# Patient Record
Sex: MALE | Race: WHITE | Employment: FULL TIME | URBAN - METROPOLITAN AREA
[De-identification: names, ages, dates, MRNs, and addresses within clinical notes are randomized per-mention and may not be internally consistent; named-entity substitution may affect disease eponyms.]

---

## 2019-12-23 PROBLEM — J30.9 ALLERGIC RHINITIS: Status: ACTIVE | Noted: 2019-12-23

## 2019-12-23 PROBLEM — R41.3 MEMORY CHANGES: Status: ACTIVE | Noted: 2019-12-23

## 2019-12-23 PROBLEM — Z00.00 PREVENTATIVE HEALTH CARE: Status: ACTIVE | Noted: 2019-12-23

## 2020-01-03 PROBLEM — R03.0 ELEVATED BP WITHOUT DIAGNOSIS OF HYPERTENSION: Status: ACTIVE | Noted: 2020-01-03

## 2020-01-22 PROBLEM — Z00.00 PREVENTATIVE HEALTH CARE: Status: RESOLVED | Noted: 2019-12-23 | Resolved: 2020-01-22

## 2020-10-30 PROBLEM — I10 ESSENTIAL HYPERTENSION: Status: ACTIVE | Noted: 2020-10-30

## 2021-03-03 PROBLEM — G30.0 EARLY ONSET ALZHEIMER'S DEMENTIA WITHOUT BEHAVIORAL DISTURBANCE (HCC): Status: ACTIVE | Noted: 2021-03-03

## 2021-03-03 PROBLEM — F02.80 EARLY ONSET ALZHEIMER'S DEMENTIA WITHOUT BEHAVIORAL DISTURBANCE (HCC): Status: ACTIVE | Noted: 2021-03-03

## 2021-08-27 PROBLEM — R00.1 BRADYCARDIA: Status: ACTIVE | Noted: 2021-08-27

## 2022-02-25 PROBLEM — R03.0 ELEVATED BP WITHOUT DIAGNOSIS OF HYPERTENSION: Status: RESOLVED | Noted: 2020-01-03 | Resolved: 2022-02-25

## 2022-08-29 ENCOUNTER — HOSPITAL ENCOUNTER (OUTPATIENT)
Dept: OCCUPATIONAL THERAPY | Age: 57
Setting detail: THERAPIES SERIES
Discharge: HOME | End: 2022-08-29
Payer: COMMERCIAL

## 2022-08-29 PROCEDURE — 97537 COMMUNITY/WORK REINTEGRATION: CPT

## 2022-08-29 PROCEDURE — 97165 OT EVAL LOW COMPLEX 30 MIN: CPT

## 2022-08-29 NOTE — PROGRESS NOTES
8/29/2022    Dear Dr. Toño Gomes (MR# [id number]) was seen by Occupational Therapy on 8/29/2022 for a s Evaluation at [facility].  As you know, Mr. Carla Crouch suffers from Alzheimer's Disease. He wants to maintain driving to be independent in community mobility and leisure skills. Mr. Carla Crouch passed tests for visual acuity, saccades, pursuits, depth perception, peripheral vision, and color vision. He was only able to identify five out of nine traffic signs and signals. He was administered the Trails Making Tests Part A and B which are cognitive tests that involve scanning, speed of mental processing, visual motor sequencing, as well as switching cognitive sets. On Part A, he scored below normal limits with 300 seconds over a norm of 60 seconds and Part B was not administered due to his difficulty with completing Part A. He demonstrated functional physical capacity for driving. He demonstrated difficulty with following directions throughout the evaluation which limited his ability to complete some of the tests. He demonstrated decreased speeds of cognitive processing which is concerning for his ability to react on the road and to adjust to variations in driving conditions. Based on the results of this evaluation, it appears that Mr. Carla Crouch is NOT a suitable candidate to maintain driving. The final decision remains with the physician. Please inform Mr. Carla Crouch of your decision. I can be reached at [phone] if questions arise.   Thank you for this referral.    Sincerely,      Caridad Rodney, [address] [facility]   Certified  Rehabilitation Specialist

## 2022-08-29 NOTE — PROGRESS NOTES
Occupational Therapy  Name: Elise Minaya  1965  Date: 8/29/2022  10:05 AM      Time In: 1000  Time Out: 1200  Billed Units: 8  CPT 80998: OT Low Eval units __1_  CPT 15071: OT Work Conditioning  units _7___  Diagnosis: Alzheimer's Disease  Prior Level of Functioning: Wife organizes medications and pt takes them himself. Independent with Aleksandra Dailey, ADL's. 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 Since last appointment with physician he only drives to work until driving evaluation is completed. Car Make/Model and transmission type: [employer], automatic  Driving Habits: Frequency: everyday  Night: yes  Snow: yes  Highway: ?yes  Traffic tickets in last 5 years: no  Accidents in last 5 years: no  Explain:    VISION:  Acuity: ([facility] law =20/40 night driving; 47/61 day driving): ____82/93____ without corrective lenses  Peripheral field: ([address] [place] [address] requires 70? visual field on both sides of a fixation point for a non-restricted license or 39? on the other side)  INTACT    Color Vision:  INTACT  Saccades: (ability to rapidly change fixation from one point in the visual field to another)    INTACT   Pursuits: (the continued fixation of a moving object)    INTACT   Depth Perception:    INTACT       COGNITION:  Trail Making Test Parts A & B (involve scanning, speed of mental processing and visual motor sequencing.   Trail Making Part B involves switching cognitive sets too)  Trail Making Part A:   _____300______seconds (Norm 60 seconds)  Trail Making Part B:   _______Did not test due to difficulty on part A.____seconds (Norm 180 seconds)          ROAD SIGN RECOGNITION:  ____4____/9 presented correctly identified    PHYSICAL:          Sensation: __WFL_ _______________________________________________________    (exceptions to normal limits marked by \"X\" and explained)   AROM-  RIGHT AROM-  LEFT STRENGTH-RIGHT STRENGTH-LEFT   SHOULDER       ELBOW       WRIST       HAND       HIP       KNEE       ANKLE         Deficits explained: ________________________________________________________     UE- RIGHT UE- LEFT LE-RIGHT LE-LEFT   PROPRIOCEPTION       LIGHT TOUCH         COORDINATION:  (\"X\" to signify intact or impaired)     INTACT IMPAIRED   NECK ROM x    HEEL TO SHIN x    FINGER-NOSE-KNEE  X-difficulty with following directions. SITTING BALANCE x    DIADOKOKINESIS       Client's Stated Goal: To continue driving. ON THE ROAD PERFORMANCE: He was able to brake appropriately and maintain his deyvi. He demonstrated difficulty with following directions throughout the evaluation. He could not complete finger-nose-knee due to difficulty with following directions. He demonstrated difficulty with following Trails Making Part A ,which is connecting numbers in order, as he frequently lost track of his last number and which number to go to next. He was only able to identify 5/9 road signs. RECOMMENDATIONS: Pt demonstrates difficulty with following directions, and problem solving. Pt demonstrates slow speeds of mental processing. Recommend pt cease driving.       WINSTON Krishnan, CDRS, [facility] [facility]   Certified  Rehabilitation Specialist

## 2022-11-10 ENCOUNTER — OFFICE VISIT (OUTPATIENT)
Dept: SURGERY | Age: 57
End: 2022-11-10
Payer: COMMERCIAL

## 2022-11-10 VITALS — WEIGHT: 197 LBS | BODY MASS INDEX: 28.27 KG/M2

## 2022-11-10 DIAGNOSIS — K40.90 LEFT INGUINAL HERNIA: Primary | ICD-10-CM

## 2022-11-10 PROCEDURE — 99204 OFFICE O/P NEW MOD 45 MIN: CPT | Performed by: SURGERY

## 2022-11-10 ASSESSMENT — ENCOUNTER SYMPTOMS: GASTROINTESTINAL NEGATIVE: 1

## 2022-11-10 NOTE — LETTER
Surgeon: Rosario Benites MD     Your surgery will be at [facility]  First floor of the [address] [place] [address]. Ana Cortez [address]    Date:11/21/2022    Arrival time:7:25am    Surgery time: 8:50am    (   ) Outpatient with general anesthesia     (  x ) Outpatient with IV sedation     (    ) Local anethesia    You will need to have a  drive you home due to anesthesia. Nothing to eat or drink after midnight the night before. FASTING SURGERY  No driving for 1 week after surgery. You will have a 10 lbs weight restriction for 6 weeks post surgery      I will call you a few days prior to surgery to confirm date and time if any changes. Please be sure to bring I.D. and insurance card at the time of surgery, you will check in with surgery on the first floor. Please contact Kelsi if you need to reschedule your surgery or have any questions.   Office phone number:     [phone]  Fax number for [facility]:    [phone]

## 2022-11-10 NOTE — PROGRESS NOTES
Subjective:      Patient ID: Jess Butler is a 61 y.o. male. HPI  Chief Complaint: hernia  Patient referred by Dr. Jany Farfan for evaluation of hernia. Patient reports symptoms of bulge. Denies much pain. Location of symptoms is left groin extending into scrotum. Symptoms were first noted \"years ago\" but wife just noticed recently. Alleviated by lying down. Symptoms aggravated by prolonged standing. Works in real estate. Previous evaluation includes exam by PCP. Patient has a history of Alzheimer's, HTN. Will plan following treatment: left inguinal hernia repair. Past Medical History:   Diagnosis Date    Hearing loss        No past surgical history on file. Current Outpatient Medications   Medication Sig Dispense Refill    cetirizine (ZYRTEC) 5 MG tablet Take 5 mg by mouth daily      valsartan (DIOVAN) 80 MG tablet TAKE 1 TABLET BY MOUTH DAILY 90 tablet 1    donepezil (ARICEPT) 10 MG tablet 1.5 tablets nightly 30 tablet 5    Multiple Vitamins-Minerals (CENTRUM SILVER 50+MEN PO) Take by mouth       No current facility-administered medications for this visit. Prior to Admission medications    Medication Sig Start Date End Date Taking?  Authorizing Provider   cetirizine (ZYRTEC) 5 MG tablet Take 5 mg by mouth daily   Yes Historical Provider, MD   valsartan (DIOVAN) 80 MG tablet TAKE 1 TABLET BY MOUTH DAILY 11/4/22  Yes Niki Echavarria MD   donepezil (ARICEPT) 10 MG tablet 1.5 tablets nightly 9/29/21  Yes Niki Echavarria MD   Multiple Vitamins-Minerals (CENTRUM SILVER 50+MEN PO) Take by mouth   Yes Historical Provider, MD         No Known Allergies    Social History     Socioeconomic History    Marital status:      Spouse name: Not on file    Number of children: Not on file    Years of education: Not on file    Highest education level: Not on file   Occupational History    Not on file   Tobacco Use    Smoking status: Never    Smokeless tobacco: Never   Substance and Sexual Activity Alcohol use: Yes    Drug use: Never    Sexual activity: Not on file   Other Topics Concern    Not on file   Social History Narrative    Not on file     Social Determinants of Health     Financial Resource Strain: Unknown    Difficulty of Paying Living Expenses: Patient refused   Food Insecurity: Unknown    Worried About Running Out of Food in the Last Year: Patient refused    Ran Out of Food in the Last Year: Patient refused   Transportation Needs: Not on file   Physical Activity: Not on file   Stress: Not on file   Social Connections: Not on file   Intimate Partner Violence: Not on file   Housing Stability: Not on file       Family History   Problem Relation Age of Onset    Atrial Fibrillation Mother     Cancer Father        Review of Systems   Constitutional: Negative. Gastrointestinal: Negative. All other systems reviewed and are negative. Objective:   Physical Exam  Vitals reviewed. Constitutional:       General: He is not in acute distress. Appearance: Normal appearance. He is well-developed. He is not diaphoretic. HENT:      Head: Normocephalic and atraumatic. Right Ear: External ear normal.      Left Ear: External ear normal.   Eyes:      Conjunctiva/sclera: Conjunctivae normal.      Pupils: Pupils are equal, round, and reactive to light. Neck:      Trachea: Trachea normal.   Cardiovascular:      Rate and Rhythm: Normal rate and regular rhythm. Heart sounds: Normal heart sounds, S1 normal and S2 normal.   Pulmonary:      Effort: Pulmonary effort is normal. No respiratory distress. Breath sounds: Normal breath sounds. No wheezing. Abdominal:      General: There is no distension. Palpations: Abdomen is soft. Hernia: A hernia (partially reducible left inguinoscrotal hernia) is present. Musculoskeletal:         General: Normal range of motion. Cervical back: Normal range of motion and neck supple. Skin:     General: Skin is warm and dry.       Findings: No erythema. Neurological:      Mental Status: He is alert and oriented to person, place, and time. Psychiatric:         Behavior: Behavior normal. Behavior is cooperative. Thought Content: Thought content normal.         Judgment: Judgment normal.       Assessment:       Diagnosis Orders   1. Left inguinal hernia                Plan:      Plan left inguinal hernia repair 11/21  The risks, benefits and alternatives to the planned procedure were discussed. Patient expressed an understanding and is willing to proceed.           Sonu Bryan MD

## 2022-11-11 NOTE — PROGRESS NOTES
[facility] time____0710________        Surgery time____0840________    Take the following medications with a sip of water: Follow your MD/Surgeons pre-procedure instructions regarding your medications     Do not eat or drink anything after 12:00 midnight prior to your surgery. This includes water chewing gum, mints and ice chips. You may brush your teeth and gargle the morning of your surgery, but do not swallow the water     Please see your family doctor/pediatrician for a history and physical and/or concerning medications. Bring any test results/reports from your physicians office. If you are under the care of a heart doctor or specialist doctor, please be aware that you may be asked to them for clearance    You may be asked to stop blood thinners such as Coumadin, Plavix, Fragmin, Lovenox, etc., or any anti-inflammatories such as:  Aspirin, Ibuprofen, Advil, Naproxen prior to your surgery. We also ask that you stop any OTC medications such as fish oil, vitamin E, glucosamine, garlic, Multivitamins, COQ 10, etc.    We ask that you do not smoke 24 hours prior to surgery  We ask that you do not  drink any alcoholic beverages 24 hours prior to surgery     You must make arrangements for a responsible adult to take you home after your surgery. For your safety you will not be allowed to leave alone or drive yourself home. Your surgery will be cancelled if you do not have a ride home. Also for your safety, it is strongly suggested that someone stay with you the first 24 hours after your surgery. A parent or legal guardian must accompany a child scheduled for surgery and plan to stay at the hospital until the child is discharged. Please do not bring other children with you. For your comfort, please wear simple loose fitting clothing to the hospital.  Please do not bring valuables.     Do not wear any make-up or nail polish on your fingers or toes      For your safety, please do not wear any jewelry or body piercing's on the day of surgery. All jewelry must be removed. If you have dentures, they will be removed before going to operating room. For your convenience, we will provide you with a container. If you wear contact lenses or glasses, they will be removed, please bring a case for them. If you have a living will and a durable power of  for healthcare, please bring in a copy. As part of our patient safety program to minimize surgical site infections, we ask you to do the following:    Please notify your surgeon if you develop any illness between         now and the  day of your surgery. This includes a cough, cold, fever, sore throat, nausea,         or vomiting, and diarrhea, etc.   Please notify your surgeon if you experience dizziness, shortness         of breath or blurred vision between now and the time of your surgery. Do not shave your operative site 96 hours prior to surgery. For face and neck surgery, men may use an electric razor 48 hours   prior to surgery. You may shower the night before surgery or the morning of   your surgery with an antibacterial soap. You will need to bring a photo ID and insurance card    [facility] has an onsite pharmacy, would you like to utilize our pharmacy     If you will be staying overnight and use a C-pap machine, please bring   your C-pap to hospital     Our goal is to provide you with excellent care, therefore, visitors will be limited to two(2) in the room at a time so that we may focus on providing this care for you. Please contact pre-admission testing if you have any further questions. [facility] phone number:  [phone] PAT fax number:  [phone]  Please note these are generalized instructions for all surgical cases, you may be provided with more specific instructions according to your surgery.     C-Difficile admission screening and protocol:       * Admitted with diarrhea? [] YES    [x]  NO     *Prior history of C-Diff. In last 3 months? [] YES    [x]  NO     *Antibiotic use in the past 6-8 weeks? [x]  NO    []  YES                 If yes, which ANTIBIOTIC AND REASON______     *Prior hospitalization or nursing home in the last month? []  YES    [x]  NO        SAFETY FIRST. .call before you fall

## 2022-11-17 ENCOUNTER — ANESTHESIA EVENT (OUTPATIENT)
Dept: OPERATING ROOM | Age: 57
End: 2022-11-17
Payer: COMMERCIAL

## 2022-11-21 ENCOUNTER — ANESTHESIA (OUTPATIENT)
Dept: OPERATING ROOM | Age: 57
End: 2022-11-21
Payer: COMMERCIAL

## 2022-11-21 ENCOUNTER — HOSPITAL ENCOUNTER (OUTPATIENT)
Age: 57
Setting detail: OUTPATIENT SURGERY
Discharge: HOME | End: 2022-11-21
Attending: SURGERY | Admitting: SURGERY
Payer: COMMERCIAL

## 2022-11-21 VITALS
BODY MASS INDEX: 28.09 KG/M2 | OXYGEN SATURATION: 98 % | DIASTOLIC BLOOD PRESSURE: 86 MMHG | SYSTOLIC BLOOD PRESSURE: 141 MMHG | RESPIRATION RATE: 14 BRPM | TEMPERATURE: 97.8 F | WEIGHT: 200.62 LBS | HEART RATE: 58 BPM | HEIGHT: 71 IN

## 2022-11-21 DIAGNOSIS — K40.90 LEFT INGUINAL HERNIA: Primary | ICD-10-CM

## 2022-11-21 DIAGNOSIS — K40.90 UNILATERAL INGUINAL HERNIA WITHOUT OBSTRUCTION OR GANGRENE, RECURRENCE NOT SPECIFIED: ICD-10-CM

## 2022-11-21 PROCEDURE — 7100000010 HC PHASE II RECOVERY - FIRST 15 MIN: Performed by: SURGERY

## 2022-11-21 PROCEDURE — 49505 PRP I/HERN INIT REDUC >5 YR: CPT | Performed by: SURGERY

## 2022-11-21 PROCEDURE — C1781 MESH (IMPLANTABLE): HCPCS | Performed by: SURGERY

## 2022-11-21 PROCEDURE — 3600000013 HC SURGERY LEVEL 3 ADDTL 15MIN: Performed by: SURGERY

## 2022-11-21 PROCEDURE — 6370000000 HC RX 637 (ALT 250 FOR IP): Performed by: ANESTHESIOLOGY

## 2022-11-21 PROCEDURE — 3600000003 HC SURGERY LEVEL 3 BASE: Performed by: SURGERY

## 2022-11-21 PROCEDURE — 2500000003 HC RX 250 WO HCPCS

## 2022-11-21 PROCEDURE — 2580000003 HC RX 258: Performed by: SURGERY

## 2022-11-21 PROCEDURE — 3700000000 HC ANESTHESIA ATTENDED CARE: Performed by: SURGERY

## 2022-11-21 PROCEDURE — 2500000003 HC RX 250 WO HCPCS: Performed by: SURGERY

## 2022-11-21 PROCEDURE — 7100000011 HC PHASE II RECOVERY - ADDTL 15 MIN: Performed by: SURGERY

## 2022-11-21 PROCEDURE — 2580000003 HC RX 258: Performed by: ANESTHESIOLOGY

## 2022-11-21 PROCEDURE — 7100000001 HC PACU RECOVERY - ADDTL 15 MIN: Performed by: SURGERY

## 2022-11-21 PROCEDURE — 3700000001 HC ADD 15 MINUTES (ANESTHESIA): Performed by: SURGERY

## 2022-11-21 PROCEDURE — 6360000002 HC RX W HCPCS

## 2022-11-21 PROCEDURE — 7100000000 HC PACU RECOVERY - FIRST 15 MIN: Performed by: SURGERY

## 2022-11-21 PROCEDURE — 6360000002 HC RX W HCPCS: Performed by: SURGERY

## 2022-11-21 PROCEDURE — 2709999900 HC NON-CHARGEABLE SUPPLY: Performed by: SURGERY

## 2022-11-21 PROCEDURE — 88302 TISSUE EXAM BY PATHOLOGIST: CPT

## 2022-11-21 DEVICE — MESH HERN W3XL6IN INGUINAL POLYPR MFIL RECTANG: Type: IMPLANTABLE DEVICE | Site: GROIN | Status: FUNCTIONAL

## 2022-11-21 RX ORDER — OXYCODONE HYDROCHLORIDE 5 MG/1
5 TABLET ORAL EVERY 6 HOURS PRN
Qty: 20 TABLET | Refills: 0 | Status: SHIPPED | OUTPATIENT
Start: 2022-11-21 | End: 2022-11-26

## 2022-11-21 RX ORDER — SODIUM CHLORIDE 0.9 % (FLUSH) 0.9 %
5-40 SYRINGE (ML) INJECTION EVERY 12 HOURS SCHEDULED
Status: DISCONTINUED | OUTPATIENT
Start: 2022-11-21 | End: 2022-11-21 | Stop reason: HOSPADM

## 2022-11-21 RX ORDER — SODIUM CHLORIDE 9 MG/ML
INJECTION, SOLUTION INTRAVENOUS PRN
Status: DISCONTINUED | OUTPATIENT
Start: 2022-11-21 | End: 2022-11-21 | Stop reason: HOSPADM

## 2022-11-21 RX ORDER — PROPOFOL 10 MG/ML
INJECTION, EMULSION INTRAVENOUS CONTINUOUS PRN
Status: DISCONTINUED | OUTPATIENT
Start: 2022-11-21 | End: 2022-11-21 | Stop reason: SDUPTHER

## 2022-11-21 RX ORDER — ONDANSETRON 2 MG/ML
4 INJECTION INTRAMUSCULAR; INTRAVENOUS
Status: DISCONTINUED | OUTPATIENT
Start: 2022-11-21 | End: 2022-11-21 | Stop reason: HOSPADM

## 2022-11-21 RX ORDER — FENTANYL CITRATE 50 UG/ML
25 INJECTION, SOLUTION INTRAMUSCULAR; INTRAVENOUS EVERY 5 MIN PRN
Status: DISCONTINUED | OUTPATIENT
Start: 2022-11-21 | End: 2022-11-21 | Stop reason: HOSPADM

## 2022-11-21 RX ORDER — FENTANYL CITRATE 50 UG/ML
INJECTION, SOLUTION INTRAMUSCULAR; INTRAVENOUS PRN
Status: DISCONTINUED | OUTPATIENT
Start: 2022-11-21 | End: 2022-11-21 | Stop reason: SDUPTHER

## 2022-11-21 RX ORDER — SODIUM CHLORIDE 0.9 % (FLUSH) 0.9 %
5-40 SYRINGE (ML) INJECTION PRN
Status: DISCONTINUED | OUTPATIENT
Start: 2022-11-21 | End: 2022-11-21 | Stop reason: HOSPADM

## 2022-11-21 RX ORDER — LIDOCAINE HYDROCHLORIDE 20 MG/ML
INJECTION, SOLUTION EPIDURAL; INFILTRATION; INTRACAUDAL; PERINEURAL PRN
Status: DISCONTINUED | OUTPATIENT
Start: 2022-11-21 | End: 2022-11-21 | Stop reason: SDUPTHER

## 2022-11-21 RX ORDER — MIDAZOLAM HYDROCHLORIDE 1 MG/ML
INJECTION INTRAMUSCULAR; INTRAVENOUS PRN
Status: DISCONTINUED | OUTPATIENT
Start: 2022-11-21 | End: 2022-11-21 | Stop reason: SDUPTHER

## 2022-11-21 RX ORDER — BUPIVACAINE HYDROCHLORIDE 5 MG/ML
INJECTION, SOLUTION EPIDURAL; INTRACAUDAL
Status: COMPLETED | OUTPATIENT
Start: 2022-11-21 | End: 2022-11-21

## 2022-11-21 RX ORDER — SODIUM CHLORIDE 9 MG/ML
INJECTION, SOLUTION INTRAVENOUS CONTINUOUS
Status: DISCONTINUED | OUTPATIENT
Start: 2022-11-21 | End: 2022-11-21 | Stop reason: HOSPADM

## 2022-11-21 RX ORDER — OXYCODONE HYDROCHLORIDE 5 MG/1
5 TABLET ORAL
Status: COMPLETED | OUTPATIENT
Start: 2022-11-21 | End: 2022-11-21

## 2022-11-21 RX ORDER — DROPERIDOL 2.5 MG/ML
0.62 INJECTION, SOLUTION INTRAMUSCULAR; INTRAVENOUS
Status: DISCONTINUED | OUTPATIENT
Start: 2022-11-21 | End: 2022-11-21 | Stop reason: HOSPADM

## 2022-11-21 RX ADMIN — CEFAZOLIN 2000 MG: 2 INJECTION, POWDER, FOR SOLUTION INTRAMUSCULAR; INTRAVENOUS at 08:38

## 2022-11-21 RX ADMIN — SODIUM CHLORIDE: 9 INJECTION, SOLUTION INTRAVENOUS at 09:18

## 2022-11-21 RX ADMIN — MIDAZOLAM 2 MG: 1 INJECTION INTRAMUSCULAR; INTRAVENOUS at 08:32

## 2022-11-21 RX ADMIN — PROPOFOL 180 MCG/KG/MIN: 10 INJECTION, EMULSION INTRAVENOUS at 08:37

## 2022-11-21 RX ADMIN — LIDOCAINE HYDROCHLORIDE 50 MG: 20 INJECTION, SOLUTION EPIDURAL; INFILTRATION; INTRACAUDAL; PERINEURAL at 08:37

## 2022-11-21 RX ADMIN — FENTANYL CITRATE 50 MCG: 50 INJECTION INTRAMUSCULAR; INTRAVENOUS at 08:45

## 2022-11-21 RX ADMIN — SODIUM CHLORIDE: 9 INJECTION, SOLUTION INTRAVENOUS at 08:14

## 2022-11-21 RX ADMIN — FENTANYL CITRATE 50 MCG: 50 INJECTION INTRAMUSCULAR; INTRAVENOUS at 08:37

## 2022-11-21 RX ADMIN — OXYCODONE 5 MG: 5 TABLET ORAL at 10:49

## 2022-11-21 ASSESSMENT — PAIN DESCRIPTION - LOCATION: LOCATION: GROIN

## 2022-11-21 ASSESSMENT — PAIN DESCRIPTION - DESCRIPTORS: DESCRIPTORS: ACHING

## 2022-11-21 ASSESSMENT — PAIN - FUNCTIONAL ASSESSMENT: PAIN_FUNCTIONAL_ASSESSMENT: 0-10

## 2022-11-21 ASSESSMENT — PAIN SCALES - GENERAL
PAINLEVEL_OUTOF10: 0
PAINLEVEL_OUTOF10: 2

## 2022-11-21 ASSESSMENT — PAIN DESCRIPTION - ORIENTATION: ORIENTATION: LEFT

## 2022-11-21 ASSESSMENT — LIFESTYLE VARIABLES: SMOKING_STATUS: 0

## 2022-11-21 NOTE — DISCHARGE INSTRUCTIONS
Sonu Bryan MD     General and Vascular Surgery   Gunjan Goode MD     [address] [facility] MD [address]     [address] [facility] [address]  [place]    Phone: [phone]           Fax: [phone]                                                                 POST-OPERATIVE INSTRUCTIONS FOR HERNIA REPAIR    Call the office to schedule your post-operative appointment with your surgeon for two (2) weeks. You will have a water occlusive glue closing your incisions. You may shower. Wash incisions gently, and pat them dry. Do not rub your incisions. Do not soak incisions in tub baths, hot tubs or pools    General guidelines for activity:  Avoid strenuous activity or lifting/pushing/pulling anything heavier than 10 pounds for 6 weeks. It is OK to be up walking around; walking up and down stairs is also OK. Do what is comfortable: stop and rest when you feel tired. Drink plenty of fluids and stay on a bland diet for 2-3 days after surgery. Do NOT drive for 5 days and while taking your narcotic pain medicine. Watch for signs of infection:   Fever over 100.5°   Excessive warmth or bright redness around your incisions  Leakage of bloody or cloudy fluid from you incisions    You will have pain medicine ordered. Take as directed. If you experience constipation  Increase your water intake, and activity; walking is best.  A stool softener or mild laxative may be necessary if you still have not had a bowel  movement ; call the office for further instructions.

## 2022-11-21 NOTE — OP NOTE
Inguinal Hernia Operative Report      Patient: Jitendra [place] MRN: [id number]     YOB: 1965  Age: 61 y.o. Sex: male        Primary Care Physician: Sami Zimmer MD         DATE OF OPERATION: 11/21/2022     PREOPERATIVE DIAGNOSIS: left inguinal hernia    POSTOPERATIVE DIAGNOSIS: left inguinal hernia - indirect    PROCEDURE PERFORMED: left inguinal hernia repair with 3x6 inch Marlex mesh    SURGEON: Kimberly Crowell MD, MD     ANESTHESIA: MAC with local anesthetic. ASA CLASS: 2    ANTIBIOTICS: Ancef 2 grams IV. DVT PROPHYLAXIS: Bilateral pneumatic compression boots. INDICATIONS:   The patient was seen in the office for an increasingly symptomatic, left inguinal hernia. As a result, the risks, benefits, and alternatives were discussed at length, including bleeding, infection, nerve pain, and recurrence. The patient's questions were answered and they were agreeable to proceed. DESCRIPTION OF PROCEDURE:   The patient was brought to the operating room suite and placed in the supine position on the operating room table. Anesthesia was induced which he tolerated well. The left groin was clipped free of hair and then prepped and draped in the usual sterile fashion. A timeout was performed, and all parties were in agreement. After injecting local anesthetic, a skin incision was made in the natural skin crease in the left groin. This was carried down sharply through Rehan's fascia. The external oblique fascia was cleared off and then additional local anesthetic was injected subfascially. The external oblique was opened in the direction of its fibers and flaps were raised. The ilioinguinal nerve was identified and preserved. The spermatic cord and its contents were encircled with a Penrose drain. A large indirect hernia was appreciated and dissected free from the spermatic cord. The hernia sac was highly ligated with a 2-0 silk suture. No other pathology was noted.  At that point, a 3 x 6 piece of Marlex mesh was cut to size in a keyhole manner. It was tacked to the pubic tubercle with a 2-0 PDS and this was run to tack the inferior edge of the mesh to the iliopubic tract. We tacked the mesh with interrupted sutures medially as well to the transversalis fascia. The internal inguinal ring was recreated by approximating the cut leaflets of the mesh. The tip of my forceps was able to pass through the newly created ring making sure it was not too tight. The inguinal nerve was then returned to its natural position. Hemostasis was achieved and the external oblique fascia was reapproximated with an 0 Vicryl. Scarpas was closed with 3-0 Vicryl, and the skin was closed with 4-0 Vicryl. Additional anesthetic was injected at the end of the case. Skin affix dermal adhesive was applied to the wound. Overall, the patient tolerated the procedure well. He was taken to PACU in stable condition. All counts were correct at the end of the case.     EBL: minimal    Specimen: hernia sac    Complications: none    Electronically signed by Renetta Quinonez MD on 11/21/2022 at 9:14 AM

## 2022-11-21 NOTE — PROGRESS NOTES
Pt wakes easy to v/o. VSS. Pt denies pain and nausea. Incisions remains cdi with ice applied. Pt stable ready for phase 2.

## 2022-11-21 NOTE — ANESTHESIA POSTPROCEDURE EVALUATION
Department of Anesthesiology  Postprocedure Note    Patient: Lolis Durant  MRN: [id number]  YOB: 1965  Date of evaluation: 11/21/2022      Procedure Summary     Date: 11/21/22 Room / Location: [address]    Anesthesia Start: 7109 Anesthesia Stop: 2804    Procedure: LEFT INGUINAL HERNIA REPAIR WITH MESH (Left: Groin) Diagnosis:       Unilateral inguinal hernia without obstruction or gangrene, recurrence not specified      (LEFT INGUINAL HERNIA)    Surgeons: Lolis Sharif MD Responsible Provider: Radha Melo MD    Anesthesia Type: MAC ASA Status: 2          Anesthesia Type: No value filed. Ching Phase I: Ching Score: 9    Ching Phase II: Ching Score: 10      Anesthesia Post Evaluation    Patient location during evaluation: PACU  Patient participation: complete - patient participated  Level of consciousness: awake  Airway patency: patent  Nausea & Vomiting: no nausea and no vomiting  Cardiovascular status: blood pressure returned to baseline  Respiratory status: acceptable  Hydration status: stable  Comments: Vital signs stable  OK to discharge from Stage I post anesthesia care.   Care transferred from Anesthesiology department on discharge from perioperative area   Multimodal analgesia pain management approach

## 2022-11-21 NOTE — ANESTHESIA PRE PROCEDURE
Department of Anesthesiology  Preprocedure Note       Name:  Stefanie Post   Age:  61 y.o.  :  1965                                          MRN:  [id number]         Date:  2022      Surgeon: Mike Treviño):  Renetta Quinonez MD    Procedure: Procedure(s):  LEFT INGUINAL HERNIA REPAIR WITH MESH    Medications prior to admission:   Prior to Admission medications    Medication Sig Start Date End Date Taking?  Authorizing Provider   cetirizine (ZYRTEC) 5 MG tablet Take 5 mg by mouth daily    Historical Provider, MD   valsartan (DIOVAN) 80 MG tablet TAKE 1 TABLET BY MOUTH DAILY 22   Celi Noriega MD   donepezil (ARICEPT) 10 MG tablet 1.5 tablets nightly 21   Celi Noriega MD   Multiple Vitamins-Minerals (CENTRUM SILVER 50+MEN PO) Take by mouth    Historical Provider, MD       Current medications:    Current Facility-Administered Medications   Medication Dose Route Frequency Provider Last Rate Last Admin    0.9 % sodium chloride infusion   IntraVENous Continuous Ashli Roman MD        sodium chloride flush 0.9 % injection 5-40 mL  5-40 mL IntraVENous 2 times per day Ashli Roman MD        sodium chloride flush 0.9 % injection 5-40 mL  5-40 mL IntraVENous PRN Ashli Roman MD        0.9 % sodium chloride infusion   IntraVENous PRN Ashli Roman MD        ceFAZolin (ANCEF) 2,000 mg in dextrose 5 % 50 mL IVPB (mini-bag)  2,000 mg IntraVENous Once Renetta Quinonez MD           Allergies:  No Known Allergies    Problem List:    Patient Active Problem List   Diagnosis Code    Memory changes R41.3    Allergic rhinitis J30.9    Essential hypertension I10    Early onset Alzheimer's dementia without behavioral disturbance ([facility] [address] G30.0, F02.80    Bradycardia R00.1    Left inguinal hernia K40.90       Past Medical History:        Diagnosis Date    Alzheimer disease ([address]     Hearing loss     Hypertension        Past Surgical History:        Procedure Laterality Date    WISDOM TOOTH EXTRACTION Social History:    Social History     Tobacco Use    Smoking status: Never    Smokeless tobacco: Never   Substance Use Topics    Alcohol use: Yes     Comment: rare                                Counseling given: Not Answered      Vital Signs (Current):   Vitals:    11/11/22 1607 11/21/22 0804 11/21/22 0806   BP:   133/87   Pulse:  66    Resp:  15    SpO2:  98%    Weight: 196 lb (88.9 kg) 200 lb 9.9 oz (91 kg)    Height: 5' 11\" (1.803 m) 5' 11\" (1.803 m)                                               BP Readings from Last 3 Encounters:   11/21/22 133/87   11/04/22 122/80   02/28/22 (!) 144/84       NPO Status:                                                                                 BMI:   Wt Readings from Last 3 Encounters:   11/21/22 200 lb 9.9 oz (91 kg)   11/10/22 197 lb (89.4 kg)   11/04/22 195 lb (88.5 kg)     Body mass index is 27.98 kg/m². CBC:   Lab Results   Component Value Date/Time    WBC 8.2 01/03/2020 10:06 AM    RBC 5.07 01/03/2020 10:06 AM    HGB 15.8 01/03/2020 10:06 AM    HCT 46.0 01/03/2020 10:06 AM    MCV 90.7 01/03/2020 10:06 AM    RDW 13.4 01/03/2020 10:06 AM     01/03/2020 10:06 AM       CMP:   Lab Results   Component Value Date/Time     03/10/2021 07:53 AM    K 5.0 03/10/2021 07:53 AM     03/10/2021 07:53 AM    CO2 25 03/10/2021 07:53 AM    BUN 13 03/10/2021 07:53 AM    CREATININE 0.8 03/10/2021 07:53 AM    GFRAA >60 03/10/2021 07:53 AM    AGRATIO 1.4 03/10/2021 07:53 AM    LABGLOM >60 03/10/2021 07:53 AM    GLUCOSE 90 03/10/2021 07:53 AM    PROT 7.1 03/10/2021 07:53 AM    CALCIUM 9.1 03/10/2021 07:53 AM    BILITOT 1.0 03/10/2021 07:53 AM    ALKPHOS 46 03/10/2021 07:53 AM    AST 27 03/10/2021 07:53 AM    ALT 23 03/10/2021 07:53 AM       POC Tests: No results for input(s): POCGLU, POCNA, POCK, POCCL, POCBUN, POCHEMO, POCHCT in the last 72 hours.     Coags: No results found for: PROTIME, INR, APTT    HCG (If Applicable): No results found for: PREGTESTUR, PREGSERUM, HCG, HCGQUANT     ABGs: No results found for: PHART, PO2ART, CLD4VCK, MAB2REJ, BEART, B0ULGWMZ     Type & Screen (If Applicable):  No results found for: LABABO, LABRH    Drug/Infectious Status (If Applicable):  No results found for: HIV, HEPCAB    COVID-19 Screening (If Applicable): No results found for: COVID19        Anesthesia Evaluation  Patient summary reviewed no history of anesthetic complications:   Airway: Mallampati: III  TM distance: >3 FB   Neck ROM: full  Mouth opening: > = 3 FB   Dental: normal exam         Pulmonary:normal exam        (-) not a current smoker                           Cardiovascular:  Exercise tolerance: good (>4 METS),   (+) hypertension:,     (-) CAD and  CLEARY      Rhythm: regular  Rate: normal                    Neuro/Psych:   (+) dementia (hx of alzheimer's)   (-) neuromuscular disease and psychiatric history           GI/Hepatic/Renal: Neg GI/Hepatic/Renal ROS       (-) liver disease and no renal disease       Endo/Other: Negative Endo/Other ROS                    Abdominal:             Vascular: negative vascular ROS. Other Findings: Has memory loss, no confusion. Today is very oriented x3          Anesthesia Plan      MAC     ASA 2     (59 yo M with PMHx of alzheimer's dz, HTN presenting for left inguinal hernia repair. Flat affect, oriented x3 today. Wife at bedside states he is doing well today but normally has some memory loss and confusion. Discussed risks and benefits to sedation including nausea, vomiting, allergic reaction, headache, delayed cognitive recovery, stroke, heart attack, respiratory depression, and death which patient understood and agreed to proceed. The patient was given the opportunity to ask questions and all questions were answered to the patient's satisfaction.  )  Induction: intravenous. MIPS: Postoperative opioids intended and Prophylactic antiemetics administered.   Anesthetic plan and risks discussed with patient. Plan discussed with CRNA. This pre-anesthesia assessment may be used as a history and physical.    DOS STAFF ADDENDUM:    Pt seen and examined, chart reviewed (including anesthesia, drug and allergy history). No interval changes to history and physical examination. Anesthetic plan, risks, benefits, alternatives, and personnel involved discussed with patient. Patient verbalized an understanding and agrees to proceed.       Shaista Crandall MD  November 21, 2022  8:18 AM

## 2022-11-21 NOTE — PROGRESS NOTES
Pt arrived to pacu from OR sedate with opa. VSS on monitor. O2 off pt breathes easy on RA. X1 incision cdi together with surgical glue. Pt remains sedate.

## 2022-12-01 ENCOUNTER — OFFICE VISIT (OUTPATIENT)
Dept: SURGERY | Age: 57
End: 2022-12-01

## 2022-12-01 VITALS — BODY MASS INDEX: 27.89 KG/M2 | WEIGHT: 200 LBS

## 2022-12-01 DIAGNOSIS — K40.90 LEFT INGUINAL HERNIA: Primary | ICD-10-CM

## 2022-12-01 RX ORDER — AMLODIPINE BESYLATE 5 MG/1
TABLET ORAL DAILY
COMMUNITY

## 2022-12-01 RX ORDER — METOPROLOL TARTRATE 50 MG/1
TABLET, FILM COATED ORAL 2 TIMES DAILY
COMMUNITY

## 2022-12-01 NOTE — PROGRESS NOTES
Subjective:      Patient ID: Sharon Benitez is a 61 y.o. male. HPI  S/p [facility] repair. Doing well. Mild pain initially has resolved. Bruising resolved as well. Regular BMs. Mild hemiscrotal swelling. No apparent complications  Review of Systems    Objective:   Physical Exam  Wound healed  Repair intact  Assessment:       Diagnosis Orders   1.  Left inguinal hernia                Plan:      Recovering well  Continue light lifting another 4 weeks  Return PRN        Katheryn De La Rosa MD

## 2023-11-13 PROBLEM — M54.30 SCIATICA: Status: ACTIVE | Noted: 2023-11-13

## 2024-06-26 PROBLEM — R10.30 GROIN PAIN: Status: ACTIVE | Noted: 2024-06-26

## 2024-07-02 ENCOUNTER — OFFICE VISIT (OUTPATIENT)
Dept: SURGERY | Age: 59
End: 2024-07-02
Payer: MEDICARE

## 2024-07-02 VITALS
BODY MASS INDEX: 27.63 KG/M2 | WEIGHT: 198 LBS | HEART RATE: 89 BPM | DIASTOLIC BLOOD PRESSURE: 81 MMHG | SYSTOLIC BLOOD PRESSURE: 137 MMHG

## 2024-07-02 DIAGNOSIS — R10.32 LEFT GROIN PAIN: Primary | ICD-10-CM

## 2024-07-02 PROCEDURE — 3075F SYST BP GE 130 - 139MM HG: CPT | Performed by: SURGERY

## 2024-07-02 PROCEDURE — 99213 OFFICE O/P EST LOW 20 MIN: CPT | Performed by: SURGERY

## 2024-07-02 PROCEDURE — 3079F DIAST BP 80-89 MM HG: CPT | Performed by: SURGERY

## 2024-07-02 ASSESSMENT — ENCOUNTER SYMPTOMS: ABDOMINAL PAIN: 1

## 2024-07-02 NOTE — PROGRESS NOTES
Cervical back: Neck supple.   Skin:     General: Skin is warm and dry.   Neurological:      Mental Status: He is alert and oriented to person, place, and time.      Cranial Nerves: No cranial nerve deficit.   Psychiatric:         Behavior: Behavior normal.         Cognition and Memory: Cognition is impaired. Memory is impaired.     Vitals    Last recorded: 07/02 0855     BP: 137/81 Pulse: 89   Weight: 89.8 kg (198 lb)          Electronically signed by DONNA QUISPE MD on 7/2/2024 at 9:06 AM        An electronic signature was used to authenticate this note.    --DONNA QUISPE MD

## 2024-07-11 ENCOUNTER — TELEPHONE (OUTPATIENT)
Dept: VASCULAR SURGERY | Age: 59
End: 2024-07-11

## 2024-07-11 DIAGNOSIS — R10.30 INGUINAL PAIN, UNSPECIFIED LATERALITY: Primary | ICD-10-CM

## 2024-07-12 NOTE — TELEPHONE ENCOUNTER
Spoke with patient's wife Kendra to notify that a CT scan was ordered. The number to central scheduling was given and Kendra stated understanding.

## (undated) DEVICE — SOLUTION IV IRRIG POUR BRL 0.9% SODIUM CHL 2F7124

## (undated) DEVICE — PENROSE DRAIN 18 X .5" SILICONE: Brand: MEDLINE

## (undated) DEVICE — CLEANER,CAUTERY TIP,2X2",STERILE: Brand: MEDLINE

## (undated) DEVICE — GOWN SIRUS NONREIN LG W/TWL: Brand: MEDLINE INDUSTRIES, INC.

## (undated) DEVICE — GLOVE ORANGE PI 7 1/2   MSG9075

## (undated) DEVICE — SUTURE PDS + SZ 2 0 L27IN ABSRB VLT L26MM CT 2 1 2 CIR PDP333H

## (undated) DEVICE — SUTURE VCRL + SZ 3-0 L27IN ABSRB UD L26MM SH 1/2 CIR VCP416H

## (undated) DEVICE — SUTURE VCRL + SZ 4-0 L18IN ABSRB UD L19MM PS-2 3/8 CIR PRIM VCP496H

## (undated) DEVICE — TUBING, SUCTION, 1/4" X 12', STRAIGHT: Brand: MEDLINE

## (undated) DEVICE — CANISTER, RIGID, 1200CC: Brand: MEDLINE INDUSTRIES, INC.

## (undated) DEVICE — MINOR SET UP: Brand: MEDLINE INDUSTRIES, INC.

## (undated) DEVICE — GLOVE ORANGE PI 7   MSG9070

## (undated) DEVICE — SUTURE PERMA-HAND SZ 2-0 L30IN NONABSORBABLE BLK L26MM SH K833H

## (undated) DEVICE — DRAPE,MINOR PROC,6X6 FEN, STER: Brand: MEDLINE

## (undated) DEVICE — SUTURE VCRL + SZ 0 L27IN ABSRB UD CT-1 L36MM 1/2 CIR TAPR VCP260H

## (undated) DEVICE — ADHESIVE SKIN CLOSURE TOP 36 CC HI VISC DERMBND MINI